# Patient Record
(demographics unavailable — no encounter records)

---

## 2025-01-28 NOTE — PHYSICAL EXAM

## 2025-01-28 NOTE — RISK ASSESSMENT

## 2025-01-28 NOTE — RISK ASSESSMENT

## 2025-01-28 NOTE — DISCUSSION/SUMMARY
[FreeTextEntry1] : Here for annual visit. Overdue for new glasses to call eye doctor. High BMI but patient is self motivated to make changes, reviewed below. Doing well in school. Occasional long periods but no symptoms of anemia, please follow up for dizziness/lightheadedness or persistent/large menstrual bleeding. Normal exam.  - Vital signs reviewed and normal - Reviewed with child during private discussion, anticipatory guidance regarding HEADSS assessment topics, and pertinent positive screens as applicable - Routine age appropriate bright futures topics discussed, including but not limited to the topics below - Provided positive reinforcement for healthy lifestyle, including encouraging physical activity, having a well rounded diet, limiting sugary snacks/drinks, and have a routine sleep schedule/getting at least 8 hours of sleep daily - Brush teeth twice daily and see dentist at least once yearly - Continue to work hard in school! Think about future plans and discuss any questions or concerns with your parents or school career advisors - Focus on healthy relationships with your peers and identify those who are a bad influence. Parents should get to know child's friends and encourage healthy relationships, avoid dangerous situations, and promote safety - Review safety with your child as they become more independent, such as knowing their home address and parents phone numbers, and who to call in case of an emergency - Getting a child their own phone is an individual family decision, however consider screen time limits and installing parental controls (you can reach out to your phone provider to learn how to do this) - AAP Bright Futures handout provided today - Follow up for next physical in 1 year Reviewed importance and purpose of yearly  exams, recommended family discuss exam prior to next appointment and request physician gender that makes patient most comfortable, and call office urgently for any private area symptoms or exam concerns.  High BMI - Provided positive reinforcement for physical activity / diet changes - Discussed sustainable, small changes to live a healthy lifestyle - Reviewed importance of positive and encouraging language, avoiding labeling and negative terms, and avoiding blame - Reviewed importance of creating healthy boundaries around food access, consistent structure of 3 meals / 2 snacks daily, and limiting unhealthy food options in the home. Reviewed do not lock or hide food as this hurts a child's self esteem - Patient centered plan: Eat less snacks / choose healthier options - Provided healthy snack option handout and physical activity handout - Follow up in 3 months   Discussed appropriate vaccines today, benefits / risks, and expected common side effects. All questions answered. VIS provided today. Call office for fever that lasts longer than 24 hours or for any parental concerns. Up to date vaccine record provided today.

## 2025-01-28 NOTE — HISTORY OF PRESENT ILLNESS
[FreeTextEntry1] : Here with Dad for annual visit No parental concerns No ER visits in the last year No subspecialty visits in the last year  Nutrition: Sugary drinks daily, trying to cut down Milk servings daily 2 Vegetable intake Yes Fruit intake Yes Eats 3 meals daily Yes Snacks daily >2 Skips meals: No Positive body image: Yes   Elimination: Hard stools once monthly   Sleep: Normal pattern   Behavior concerns: No concerns Mood concerns: No concerns   Activity: Minutes active daily 0 Family attempting to bring her to more activity, gym/running, patient interested   School: IEP No Performance Normal Parent/teacher concerns None   Screen time amount <2 hours Screen time monitored Yes Screen time plan discussed Yes   Environment: Smoke exposure No Firearms in the home No Wears seatbelt Yes   Dental home Yes Last visit Within last 1 year Brushes twice daily Yes Source of fluoride Toothpaste  Adolescent confidentially discussed with patient/parent: Yes  ALEX 7 normal CRAFTT normal  Home: Has positive relationship with parent(s): yes Comfortable asking parents(s)/family for help: yes   Education: Has positive attitude toward school: yes Is involved in school activities: yes Good academic achievement: yes   Activities: Is involved in group activities: yes Is engaged in risky and/or harmful activities: no   Drugs: Using drugs: no Using cannabis: no Using alcohol: no Using tobacco: no   Sexuality: Previously sexually active: no Currently sexually active: no   Suicide/Depression/Self-Image: Has positive self-esteem/self-image: yes Has anxiety: no Has depression: no Has suicidal ideation: no   Safety: Feels safe at home: yes Feels safe at school: yes   Periods are regular every month, no missed periods Pain not causing school absenteeism Sometimes bleeds longer than 8 days, a few times per year, no dizziness/headaches